# Patient Record
Sex: MALE | Race: BLACK OR AFRICAN AMERICAN
[De-identification: names, ages, dates, MRNs, and addresses within clinical notes are randomized per-mention and may not be internally consistent; named-entity substitution may affect disease eponyms.]

---

## 2017-12-19 ENCOUNTER — HOSPITAL ENCOUNTER (OUTPATIENT)
Dept: HOSPITAL 62 - ER | Age: 32
Setting detail: OBSERVATION
LOS: 2 days | Discharge: HOME | End: 2017-12-21
Attending: INTERNAL MEDICINE | Admitting: INTERNAL MEDICINE
Payer: MEDICARE

## 2017-12-19 DIAGNOSIS — Z83.2: ICD-10-CM

## 2017-12-19 DIAGNOSIS — G89.29: ICD-10-CM

## 2017-12-19 DIAGNOSIS — Z90.49: ICD-10-CM

## 2017-12-19 DIAGNOSIS — D57.219: Primary | ICD-10-CM

## 2017-12-19 PROCEDURE — 81001 URINALYSIS AUTO W/SCOPE: CPT

## 2017-12-19 PROCEDURE — 80048 BASIC METABOLIC PNL TOTAL CA: CPT

## 2017-12-19 PROCEDURE — 99285 EMERGENCY DEPT VISIT HI MDM: CPT

## 2017-12-19 PROCEDURE — G0378 HOSPITAL OBSERVATION PER HR: HCPCS

## 2017-12-19 PROCEDURE — 85025 COMPLETE CBC W/AUTO DIFF WBC: CPT

## 2017-12-19 PROCEDURE — 80053 COMPREHEN METABOLIC PANEL: CPT

## 2017-12-19 PROCEDURE — 96375 TX/PRO/DX INJ NEW DRUG ADDON: CPT

## 2017-12-19 PROCEDURE — 85045 AUTOMATED RETICULOCYTE COUNT: CPT

## 2017-12-19 PROCEDURE — 80307 DRUG TEST PRSMV CHEM ANLYZR: CPT

## 2017-12-19 PROCEDURE — 96374 THER/PROPH/DIAG INJ IV PUSH: CPT

## 2017-12-19 PROCEDURE — 96376 TX/PRO/DX INJ SAME DRUG ADON: CPT

## 2017-12-19 PROCEDURE — 36415 COLL VENOUS BLD VENIPUNCTURE: CPT

## 2017-12-20 LAB
ALBUMIN SERPL-MCNC: 3 G/DL (ref 3.5–5)
ALP SERPL-CCNC: 43 U/L (ref 38–126)
ALT SERPL-CCNC: 18 U/L (ref 21–72)
ANION GAP SERPL CALC-SCNC: 8 MMOL/L (ref 5–19)
ANION GAP SERPL CALC-SCNC: 9 MMOL/L (ref 5–19)
APPEARANCE UR: CLEAR
AST SERPL-CCNC: 12 U/L (ref 17–59)
BARBITURATES UR QL SCN: NEGATIVE
BASOPHILS # BLD AUTO: 0.2 10^3/UL (ref 0–0.2)
BASOPHILS # BLD AUTO: 0.2 10^3/UL (ref 0–0.2)
BASOPHILS NFR BLD AUTO: 1.7 % (ref 0–2)
BASOPHILS NFR BLD AUTO: 1.9 % (ref 0–2)
BILIRUB DIRECT SERPL-MCNC: 0.2 MG/DL (ref 0–0.4)
BILIRUB SERPL-MCNC: 2.3 MG/DL (ref 0.2–1.3)
BILIRUB UR QL STRIP: NEGATIVE
BUN SERPL-MCNC: 7 MG/DL (ref 7–20)
BUN SERPL-MCNC: 9 MG/DL (ref 7–20)
CALCIUM: 7.6 MG/DL (ref 8.4–10.2)
CALCIUM: 9.8 MG/DL (ref 8.4–10.2)
CHLORIDE SERPL-SCNC: 107 MMOL/L (ref 98–107)
CHLORIDE SERPL-SCNC: 113 MMOL/L (ref 98–107)
CO2 SERPL-SCNC: 23 MMOL/L (ref 22–30)
CO2 SERPL-SCNC: 27 MMOL/L (ref 22–30)
CREAT SERPL-MCNC: 0.77 MG/DL (ref 0.52–1.25)
CREAT SERPL-MCNC: 0.93 MG/DL (ref 0.52–1.25)
EOSINOPHIL # BLD AUTO: 0.5 10^3/UL (ref 0–0.6)
EOSINOPHIL # BLD AUTO: 0.6 10^3/UL (ref 0–0.6)
EOSINOPHIL NFR BLD AUTO: 5.7 % (ref 0–6)
EOSINOPHIL NFR BLD AUTO: 6.5 % (ref 0–6)
ERYTHROCYTE [DISTWIDTH] IN BLOOD BY AUTOMATED COUNT: 15.2 % (ref 11.5–14)
ERYTHROCYTE [DISTWIDTH] IN BLOOD BY AUTOMATED COUNT: 15.2 % (ref 11.5–14)
GLUCOSE SERPL-MCNC: 73 MG/DL (ref 75–110)
GLUCOSE SERPL-MCNC: 86 MG/DL (ref 75–110)
GLUCOSE UR STRIP-MCNC: NEGATIVE MG/DL
HCT VFR BLD CALC: 31.6 % (ref 37.9–51)
HCT VFR BLD CALC: 36.7 % (ref 37.9–51)
HGB BLD-MCNC: 11.2 G/DL (ref 13.5–17)
HGB BLD-MCNC: 12.8 G/DL (ref 13.5–17)
HGB HCT DIFFERENCE: 1.7
HGB HCT DIFFERENCE: 2
KETONES UR STRIP-MCNC: NEGATIVE MG/DL
LYMPHOCYTES # BLD AUTO: 3.6 10^3/UL (ref 0.5–4.7)
LYMPHOCYTES # BLD AUTO: 3.8 10^3/UL (ref 0.5–4.7)
LYMPHOCYTES NFR BLD AUTO: 38.9 % (ref 13–45)
LYMPHOCYTES NFR BLD AUTO: 42.9 % (ref 13–45)
MCH RBC QN AUTO: 30.2 PG (ref 27–33.4)
MCH RBC QN AUTO: 30.7 PG (ref 27–33.4)
MCHC RBC AUTO-ENTMCNC: 35 G/DL (ref 32–36)
MCHC RBC AUTO-ENTMCNC: 35.4 G/DL (ref 32–36)
MCV RBC AUTO: 86 FL (ref 80–97)
MCV RBC AUTO: 87 FL (ref 80–97)
METHADONE UR QL SCN: NEGATIVE
MONOCYTES # BLD AUTO: 0.9 10^3/UL (ref 0.1–1.4)
MONOCYTES # BLD AUTO: 1.1 10^3/UL (ref 0.1–1.4)
MONOCYTES NFR BLD AUTO: 10.7 % (ref 3–13)
MONOCYTES NFR BLD AUTO: 11.1 % (ref 3–13)
NEUTROPHILS # BLD AUTO: 3.3 10^3/UL (ref 1.7–8.2)
NEUTROPHILS # BLD AUTO: 4.1 10^3/UL (ref 1.7–8.2)
NEUTS SEG NFR BLD AUTO: 38.8 % (ref 42–78)
NEUTS SEG NFR BLD AUTO: 41.8 % (ref 42–78)
NITRITE UR QL STRIP: NEGATIVE
PCP UR QL SCN: NEGATIVE
PH UR STRIP: 6 [PH] (ref 5–9)
POTASSIUM SERPL-SCNC: 3.2 MMOL/L (ref 3.6–5)
POTASSIUM SERPL-SCNC: 4.2 MMOL/L (ref 3.6–5)
PROT SERPL-MCNC: 5.6 G/DL (ref 6.3–8.2)
PROT UR STRIP-MCNC: NEGATIVE MG/DL
RBC # BLD AUTO: 3.64 10^6/UL (ref 4.35–5.55)
RBC # BLD AUTO: 4.25 10^6/UL (ref 4.35–5.55)
SODIUM SERPL-SCNC: 143.3 MMOL/L (ref 137–145)
SODIUM SERPL-SCNC: 143.9 MMOL/L (ref 137–145)
SP GR UR STRIP: 1.01
URINE OPIATES LOW: NEGATIVE
UROBILINOGEN UR-MCNC: NEGATIVE MG/DL (ref ?–2)
WBC # BLD AUTO: 8.4 10^3/UL (ref 4–10.5)
WBC # BLD AUTO: 9.9 10^3/UL (ref 4–10.5)

## 2017-12-20 RX ADMIN — HEPARIN SODIUM SCH UNIT: 5000 INJECTION, SOLUTION INTRAVENOUS; SUBCUTANEOUS at 14:48

## 2017-12-20 RX ADMIN — DIPHENHYDRAMINE HYDROCHLORIDE PRN MG: 25 CAPSULE ORAL at 21:06

## 2017-12-20 RX ADMIN — PROMETHAZINE HYDROCHLORIDE PRN MG: 25 TABLET ORAL at 06:51

## 2017-12-20 RX ADMIN — SODIUM CHLORIDE SCH ML: 9 INJECTION, SOLUTION INTRAVENOUS at 14:48

## 2017-12-20 RX ADMIN — SODIUM CHLORIDE SCH ML: 9 INJECTION, SOLUTION INTRAVENOUS at 10:58

## 2017-12-20 RX ADMIN — HYDROMORPHONE HYDROCHLORIDE PRN MG: 2 INJECTION INTRAMUSCULAR; INTRAVENOUS; SUBCUTANEOUS at 14:48

## 2017-12-20 RX ADMIN — HYDROMORPHONE HYDROCHLORIDE PRN MG: 2 INJECTION INTRAMUSCULAR; INTRAVENOUS; SUBCUTANEOUS at 00:20

## 2017-12-20 RX ADMIN — HYDROMORPHONE HYDROCHLORIDE PRN MG: 2 INJECTION INTRAMUSCULAR; INTRAVENOUS; SUBCUTANEOUS at 01:19

## 2017-12-20 RX ADMIN — HEPARIN SODIUM SCH UNIT: 5000 INJECTION, SOLUTION INTRAVENOUS; SUBCUTANEOUS at 21:07

## 2017-12-20 RX ADMIN — PROMETHAZINE HYDROCHLORIDE PRN MG: 25 TABLET ORAL at 14:48

## 2017-12-20 RX ADMIN — SODIUM CHLORIDE SCH ML: 9 INJECTION, SOLUTION INTRAVENOUS at 06:51

## 2017-12-20 RX ADMIN — HYDROMORPHONE HYDROCHLORIDE PRN MG: 2 INJECTION INTRAMUSCULAR; INTRAVENOUS; SUBCUTANEOUS at 10:58

## 2017-12-20 RX ADMIN — HYDROMORPHONE HYDROCHLORIDE PRN MG: 2 INJECTION INTRAMUSCULAR; INTRAVENOUS; SUBCUTANEOUS at 02:29

## 2017-12-20 RX ADMIN — HYDROMORPHONE HYDROCHLORIDE PRN MG: 2 INJECTION INTRAMUSCULAR; INTRAVENOUS; SUBCUTANEOUS at 21:07

## 2017-12-20 RX ADMIN — HEPARIN SODIUM SCH UNIT: 5000 INJECTION, SOLUTION INTRAVENOUS; SUBCUTANEOUS at 06:51

## 2017-12-20 NOTE — PDOC PROGRESS REPORT
Subjective


Progress Note for:: 12/20/17


Subjective:: 


Pt is seen on morning rounds as a follow up for sickle cell pain.  He is found 

resting in bed comfortably on room air.


He reports that his pain is well controlled at the moment, but requests that he 

be ordered Benadryl and promethazine to take as adjuncts to the pain 

medication.  He reports that his outpatient pain medication regimen has been 

decreased recently and that he believes that this is what has exacerbated his 

sickle cell pain.  He states that he is currently prescribed oxycodone 15 mg 1-

2 tabs 3 times daily as needed for pain.  He reports that his last sickle cell 

crisis was in October and that otherwise he has been in his usual state of 

health.  


Currently, he complains of bilateral lower back pain and right hip pain that is 

worsened by ambulation.





Reason For Visit: 


SICKLE CELL PAIN CRISIS








Physical Exam


Vital Signs: 


 











Temp Pulse Resp BP Pulse Ox


 


 97.8 F   97   16   137/98 H  100 


 


 12/20/17 12:00  12/20/17 12:00  12/20/17 12:00  12/20/17 12:00  12/20/17 12:00








 Intake & Output











 12/19/17 12/20/17 12/21/17





 06:59 06:59 06:59


 


Weight  61.7 kg 











General appearance: PRESENT: no acute distress, well-developed, well-nourished


Head exam: PRESENT: atraumatic, normocephalic


Eye exam: PRESENT: conjunctiva pink, EOMI, PERRLA.  ABSENT: scleral icterus


Ear exam: PRESENT: normal external ear exam


Mouth exam: PRESENT: moist, tongue midline


Neck exam: ABSENT: carotid bruit, JVD, lymphadenopathy, thyromegaly


Respiratory exam: PRESENT: clear to auscultation sabrina.  ABSENT: rales, rhonchi, 

wheezes


Cardiovascular exam: PRESENT: RRR.  ABSENT: diastolic murmur, rubs, systolic 

murmur


Pulses: PRESENT: normal dorsalis pedis pul


Vascular exam: PRESENT: normal capillary refill


GI/Abdominal exam: PRESENT: normal bowel sounds, soft.  ABSENT: distended, 

guarding, mass, organolmegaly, rebound, tenderness


Rectal exam: PRESENT: deferred


Extremities exam: PRESENT: full ROM.  ABSENT: calf tenderness, clubbing, pedal 

edema


Neurological exam: PRESENT: alert, awake, oriented to person, oriented to place

, oriented to time, oriented to situation, CN II-XII grossly intact.  ABSENT: 

motor sensory deficit


Psychiatric exam: PRESENT: appropriate affect, normal mood.  ABSENT: homicidal 

ideation, suicidal ideation


Skin exam: PRESENT: dry, intact, warm.  ABSENT: cyanosis, rash





Results


Laboratory Results: 


 





 12/20/17 05:55 





 12/20/17 05:55 





 











  12/20/17 12/20/17





  05:55 05:55


 


WBC  8.4 


 


RBC  3.64 L 


 


Hgb  11.2 L 


 


Hct  31.6 L 


 


MCV  87 


 


MCH  30.7 


 


MCHC  35.4 


 


RDW  15.2 H 


 


Plt Count  359 


 


Seg Neutrophils %  38.8 L 


 


Lymphocytes %  42.9 


 


Monocytes %  10.7 


 


Eosinophils %  5.7 


 


Basophils %  1.9 


 


Absolute Neutrophils  3.3 


 


Absolute Lymphocytes  3.6 


 


Absolute Monocytes  0.9 


 


Absolute Eosinophils  0.5 


 


Absolute Basophils  0.2 


 


Sodium   143.9


 


Potassium   3.2 L D


 


Chloride   113 H


 


Carbon Dioxide   23


 


Anion Gap   8


 


BUN   7


 


Creatinine   0.77


 


Est GFR ( Amer)   > 60


 


Est GFR (Non-Af Amer)   > 60


 


Glucose   73 L


 


Calcium   7.6 L


 


Total Bilirubin   2.3 H


 


AST   12 L


 


ALT   18 L


 


Alkaline Phosphatase   43


 


Total Protein   5.6 L


 


Albumin   3.0 L














Assessment & Plan





- Diagnosis


(1) Sickle cell pain crisis


Is this a current diagnosis for this admission?: Yes   


Plan: 


No biochemical evidence for hemolysis, ischemia or increased reticulocytosis.


He has been admitted with IV fluid support and symptomatic management with 

narcotic pain medications and antiemetics.





Will resume the patient home medication; oxycodone 15 mg TID.


Will decrease dose/frequency of Dilaudid to q6 hrs prn severe/breakthrough 

pain.  Pt additionally has tylenol and toradol available as needed for pain.


Benadryl is provided q6 prn itching.  Promethazine has been discontinued as the 

pt denies ongoing nausea/vomiting.  As needed Zofran remains.








(2) Chronic pain


Plan: 


Review of the [place] Controlled Substance database reveals that he has recently 

been prescribed Morphine 15 mg 1 tab daily #30 and Oxycodone 15 mg 1 tab TID.





Will resume his home medication:Oxycodone 15 mg TID.  Remaining pain management 

plan as above.








- Time


Time Spent with patient: 15-24 minutes


Anticipated discharge: Home


Within: within 24 hours

## 2017-12-20 NOTE — ER DOCUMENT REPORT
ED General





- General


Chief Complaint: Sickle Cell Crisis


Stated Complaint: PAIN


Time Seen by Provider: 12/19/17 23:48


Notes: 





Patient is a 32-year-old male with a past medical history of sickle cell anemia 

hemoglobin SC who presents with an acute pain crisis.  He describes pain as 

being in his bilateral hips and thighs bilaterally as well as into his low 

back.  He reports that this is very typical for sickle cell pain crisis.  He 

states the pancreas started earlier today and has been a constant, throbbing, 

moderate to severe pain.  He took oxycodone 15 mg tablet at home without any 

relief of his pain.  He denies any associated fever, nausea, vomiting, 

shortness of breath or chest pain.  He denies any history of acute chest 

syndrome in the past.  He has not seen his hematologist regarding today's 

concerns although he states he did inform him that he was coming to the 

hospital today.


TRAVEL OUTSIDE OF THE [place] IN LAST 30 DAYS: No





- Related Data


Allergies/Adverse Reactions: 


 





No Known Allergies Allergy (Unverified 12/20/17 00:17)


 











Past Medical History





- General


Information source: Patient





- Social History


Smoking Status: Never Smoker


Chew tobacco use (# tins/day): No


Frequency of alcohol use: None


Drug Abuse: None


Lives with: Friend


Family History: Reviewed & Not Pertinent


Patient has suicidal ideation: No


Patient has homicidal ideation: No


Renal/ Medical History: Denies: Hx Peritoneal Dialysis


Past Surgical History: Reports: Hx Cholecystectomy





Review of Systems





- Review of Systems


Notes: 





Constitutional: Negative for fever.


HENT: Negative for sore throat.


Eyes: Negative for visual changes.


Cardiovascular: Negative for chest pain.


Respiratory: Negative for shortness of breath.


Gastrointestinal: Negative for abdominal pain, vomiting or diarrhea.


Genitourinary: Negative for dysuria.


Musculoskeletal: Positive for back and hip pain


Skin: Negative for rash.


Neurological: Negative for headaches, weakness or numbness.





10 point ROS negative except as marked above and in HPI.





Physical Exam





- Vital signs


Vitals: 


 











Temp Pulse Resp BP Pulse Ox


 


 98.5 F   98   18   145/93 H  100 


 


 12/19/17 22:26  12/19/17 22:26  12/19/17 22:26  12/19/17 22:26  12/19/17 22:26











Interpretation: Normal


Notes: 





PHYSICAL EXAMINATION:





GENERAL: Well-appearing, well-nourished and in no acute distress.





HEAD: Atraumatic, normocephalic.





EYES: Pupils equal round and reactive to light, extraocular movements intact, 

sclera anicteric, conjunctiva are normal.





ENT: nares patent, oropharynx clear without exudates.  Moist mucous membranes.





NECK: Normal range of motion, supple without lymphadenopathy





LUNGS: Breath sounds clear to auscultation bilaterally and equal.  No wheezes 

rales or rhonchi.





HEART: Regular rate and rhythm without murmurs





ABDOMEN: Soft, nontender, normoactive bowel sounds.  No guarding, no rebound.  

No masses appreciated.





EXTREMITIES: Normal range of motion, no pitting or edema.  No cyanosis.





NEUROLOGICAL: No focal neurological deficits. Moves all extremities 

spontaneously and on command.





PSYCH: Normal mood, normal affect.





SKIN: Warm, Dry, normal turgor, no rashes or lesions noted.





Course





- Re-evaluation


Re-evalutation: 





12/20/17 00:17


Presentation is most consistent with an uncomplicated sickle cell pain crisis.  

Patient has no evidence of an aplastic crisis on labs.  Clinical history and 

vitals are not consistent with acute chest syndrome and patient has no history 

of this diagnosis.  Vitals have remained within normal limits here in the 

emergency department.  I have been unable to adequately control the patient's 

pain with IV analgesia and will hospitalize him at his request.  








- Vital Signs


Vital signs: 


 











Temp Pulse Resp BP Pulse Ox


 


 98.5 F   91   16   144/91 H  99 


 


 12/20/17 02:30  12/20/17 02:30  12/20/17 02:30  12/20/17 02:30  12/20/17 02:30














- Laboratory


Result Diagrams: 


 12/20/17 00:02





 12/20/17 00:02


Laboratory results interpreted by me: 


 











  12/20/17





  00:02


 


RBC  4.25 L


 


Hgb  12.8 L


 


Hct  36.7 L


 


RDW  15.2 H


 


Seg Neutrophils %  41.8 L


 


Eosinophils %  6.5 H














Discharge





- Discharge


Clinical Impression: 


 Sickle cell pain crisis





Condition: Fair


Disposition: ADMITTED AS OBSERVATION


Admitting Provider: Hospitalist - Alex


Unit Admitted: Medical Floor


Additional Instructions: 


You were seen today for sickle cell pain crisis.  Please follow-up with your 

hematologist.  Returning to the ED if you have worsening pain, fever greater 

than 100.4, shortness of breath, persistent vomiting, or any other symptoms 

that are concerning to you.

## 2017-12-20 NOTE — PDOC H&P
History of Present Illness


Admission Date/PCP: 


  12/20/17 04:19





  GABI PARKS MD





Patient complains of: Sickle cell pain crisis


History of Present Illness: 


BENIGNO KATZ is a 32 year old male with a past medical history of sickle 

cell trait.  Presents with 24 hours of hip and leg pain resembling previous 

sickle cell pain crisis.  He denies fever chills nausea vomiting, he admits 

cold exposure triggering his crisis.  Denies recent change in medicine and is 

otherwise felt well.  In the emergency room he has an unremarkable workup and 

receives empiric management with IV fluid challenge and Dilaudid.  He is 

referred to the hospitalist for admission








Past Medical History


Medical History: None


Hematology: Reports: Other - Sickle cell trait





Past Surgical History


Past Surgical History: Reports: Cholecystectomy, Other - Port placement





Social History


Information Source: Patient


Lives with: Friend


Smoking Status: Never Smoker


Drugs: None





- Advance Directive


Resuscitation Status: Full Code





Family History


Family History: CAD, Other - Sickle cell


Parental Family History Reviewed: Yes


Children Family History Reviewed: Yes


Sibling(s) Family History Reviewed.: Yes





Medication/Allergy


Allergies/Adverse Reactions: 


 





No Known Allergies Allergy (Unverified 12/20/17 00:17)


 











Review of Systems


Constitutional: ABSENT: chills, fever(s), headache(s), weight gain, weight loss


Eyes: ABSENT: visual disturbances


Ears: ABSENT: hearing changes


Cardiovascular: ABSENT: chest pain, dyspnea on exertion, edema, orthropnea, 

palpitations


Respiratory: ABSENT: cough, hemoptysis


Gastrointestinal: ABSENT: abdominal pain, constipation, diarrhea, hematemesis, 

hematochezia, nausea, vomiting


Genitourinary: ABSENT: dysuria, hematuria


Musculoskeletal: ABSENT: joint swelling


Integumentary: ABSENT: rash, wounds


Neurological: ABSENT: abnormal gait, abnormal speech, confusion, dizziness, 

focal weakness, syncope


Psychiatric: ABSENT: anxiety, depression, homidical ideation, suicidal ideation


Endocrine: ABSENT: cold intolerance, heat intolerance, polydipsia, polyuria


Hematologic/Lymphatic: ABSENT: easy bleeding, easy bruising





Physical Exam


Vital Signs: 


 











Temp Pulse Resp BP Pulse Ox


 


 98.4 F   85   16   156/100 H  100 


 


 12/20/17 06:25  12/20/17 06:25  12/20/17 06:25  12/20/17 06:25  12/20/17 06:25








 Intake & Output











 12/18/17 12/19/17 12/20/17





 11:59 11:59 11:59


 


Weight   61.7 kg











General appearance: PRESENT: no acute distress, well-developed, well-nourished


Head exam: PRESENT: atraumatic, normocephalic


Eye exam: PRESENT: conjunctiva pink, EOMI, PERRLA.  ABSENT: scleral icterus


Ear exam: PRESENT: normal external ear exam


Mouth exam: PRESENT: moist, tongue midline


Neck exam: ABSENT: carotid bruit, JVD, lymphadenopathy, thyromegaly


Respiratory exam: PRESENT: clear to auscultation sabrina.  ABSENT: rales, rhonchi, 

wheezes


Cardiovascular exam: PRESENT: RRR.  ABSENT: diastolic murmur, rubs, systolic 

murmur


Pulses: PRESENT: normal dorsalis pedis pul


Vascular exam: PRESENT: normal capillary refill


GI/Abdominal exam: PRESENT: normal bowel sounds, soft.  ABSENT: distended, 

guarding, mass, organolmegaly, rebound, tenderness


Rectal exam: PRESENT: deferred


Extremities exam: PRESENT: full ROM.  ABSENT: calf tenderness, clubbing, pedal 

edema


Neurological exam: PRESENT: alert, awake, oriented to person, oriented to place

, oriented to time, oriented to situation, CN II-XII grossly intact.  ABSENT: 

motor sensory deficit


Psychiatric exam: PRESENT: appropriate affect, normal mood.  ABSENT: homicidal 

ideation, suicidal ideation


Skin exam: PRESENT: dry, intact, warm.  ABSENT: cyanosis, rash





Results


Laboratory Results: 


 





 12/20/17 05:55 





 12/20/17 05:55 





 











  12/20/17 12/20/17





  05:55 05:55


 


WBC  8.4 


 


RBC  3.64 L 


 


Hgb  11.2 L 


 


Hct  31.6 L 


 


MCV  87 


 


MCH  30.7 


 


MCHC  35.4 


 


RDW  15.2 H 


 


Plt Count  359 


 


Seg Neutrophils %  38.8 L 


 


Lymphocytes %  42.9 


 


Monocytes %  10.7 


 


Eosinophils %  5.7 


 


Basophils %  1.9 


 


Absolute Neutrophils  3.3 


 


Absolute Lymphocytes  3.6 


 


Absolute Monocytes  0.9 


 


Absolute Eosinophils  0.5 


 


Absolute Basophils  0.2 


 


Sodium   143.9


 


Potassium   3.2 L D


 


Chloride   113 H


 


Carbon Dioxide   23


 


Anion Gap   8


 


BUN   7


 


Creatinine   0.77


 


Est GFR ( Amer)   > 60


 


Est GFR (Non-Af Amer)   > 60


 


Glucose   73 L


 


Calcium   7.6 L


 


Total Bilirubin   2.3 H


 


AST   12 L


 


ALT   18 L


 


Alkaline Phosphatase   43


 


Total Protein   5.6 L


 


Albumin   3.0 L














Assessment & Plan





- Diagnosis


(1) Sickle cell pain crisis


Is this a current diagnosis for this admission?: Yes   


Plan: 


No biochemical evidence for hemolysis, ischemia or increased reticulocytosis.  

Observation on the medical floor, education, symptomatic management, IV fluid 

challenge.  Follow-up CBC.








- Time


Time Spent: 30 to 50 Minutes

## 2017-12-21 VITALS — DIASTOLIC BLOOD PRESSURE: 91 MMHG | SYSTOLIC BLOOD PRESSURE: 131 MMHG

## 2017-12-21 LAB
ALBUMIN SERPL-MCNC: 3.8 G/DL (ref 3.5–5)
ALP SERPL-CCNC: 56 U/L (ref 38–126)
ALT SERPL-CCNC: 21 U/L (ref 21–72)
ANION GAP SERPL CALC-SCNC: 9 MMOL/L (ref 5–19)
ANISOCYTOSIS BLD QL SMEAR: (no result)
AST SERPL-CCNC: 16 U/L (ref 17–59)
BASOPHILS # BLD AUTO: 0.2 10^3/UL (ref 0–0.2)
BASOPHILS NFR BLD AUTO: 1.6 % (ref 0–2)
BILIRUB DIRECT SERPL-MCNC: 0.3 MG/DL (ref 0–0.4)
BILIRUB SERPL-MCNC: 3 MG/DL (ref 0.2–1.3)
BUN SERPL-MCNC: 10 MG/DL (ref 7–20)
CALCIUM: 9.1 MG/DL (ref 8.4–10.2)
CHLORIDE SERPL-SCNC: 100 MMOL/L (ref 98–107)
CO2 SERPL-SCNC: 30 MMOL/L (ref 22–30)
CREAT SERPL-MCNC: 0.96 MG/DL (ref 0.52–1.25)
DACRYOCYTES BLD QL SMEAR: SLIGHT
EOSINOPHIL # BLD AUTO: 1.1 10^3/UL (ref 0–0.6)
EOSINOPHIL NFR BLD AUTO: 10.2 % (ref 0–6)
ERYTHROCYTE [DISTWIDTH] IN BLOOD BY AUTOMATED COUNT: 15 % (ref 11.5–14)
GLUCOSE SERPL-MCNC: 98 MG/DL (ref 75–110)
HCT VFR BLD CALC: 36.7 % (ref 37.9–51)
HGB BLD-MCNC: 12.9 G/DL (ref 13.5–17)
HGB HCT DIFFERENCE: 2
LYMPHOCYTES # BLD AUTO: 4.7 10^3/UL (ref 0.5–4.7)
LYMPHOCYTES NFR BLD AUTO: 43.1 % (ref 13–45)
MCH RBC QN AUTO: 30.1 PG (ref 27–33.4)
MCHC RBC AUTO-ENTMCNC: 35.2 G/DL (ref 32–36)
MCV RBC AUTO: 86 FL (ref 80–97)
MONOCYTES # BLD AUTO: 0.9 10^3/UL (ref 0.1–1.4)
MONOCYTES NFR BLD AUTO: 8.7 % (ref 3–13)
NEUTROPHILS # BLD AUTO: 3.9 10^3/UL (ref 1.7–8.2)
NEUTS SEG NFR BLD AUTO: 36.4 % (ref 42–78)
POIKILOCYTOSIS BLD QL SMEAR: (no result)
POLYCHROMASIA BLD QL SMEAR: SLIGHT
POTASSIUM SERPL-SCNC: 4.1 MMOL/L (ref 3.6–5)
PROT SERPL-MCNC: 6.7 G/DL (ref 6.3–8.2)
RBC # BLD AUTO: 4.29 10^6/UL (ref 4.35–5.55)
SCHISTOCYTES BLD QL SMEAR: SLIGHT
SODIUM SERPL-SCNC: 139 MMOL/L (ref 137–145)
TARGETS BLD QL SMEAR: (no result)
WBC # BLD AUTO: 10.8 10^3/UL (ref 4–10.5)

## 2017-12-21 RX ADMIN — DIPHENHYDRAMINE HYDROCHLORIDE PRN MG: 25 CAPSULE ORAL at 02:59

## 2017-12-21 RX ADMIN — HEPARIN SODIUM SCH UNIT: 5000 INJECTION, SOLUTION INTRAVENOUS; SUBCUTANEOUS at 05:54

## 2017-12-21 RX ADMIN — HYDROMORPHONE HYDROCHLORIDE PRN MG: 2 INJECTION INTRAMUSCULAR; INTRAVENOUS; SUBCUTANEOUS at 02:59

## 2017-12-21 NOTE — PDOC DISCHARGE SUMMARY
General





- Admit/Disc Date/PCP


Admission Date/Primary Care Provider: 


  12/20/17 04:19





  





Discharge Date: 12/21/17





- Discharge Diagnosis


(1) Sickle cell pain crisis


Is this a current diagnosis for this admission?: Yes   





(2) Chronic pain


Is this a current diagnosis for this admission?: Yes   





- Additional Information


Resuscitation Status: Full Code


Discharge Diet: As Tolerated, Other (Comments)


Discharge Activity: Activity As Tolerated, Balance Activity w/Rest, Slowly 

Increase Activity


Prescriptions: 


Oxycodone HCl [Oxy-Ir 5 mg Tablet] 5 mg PO Q4HP PRN #20 tablet


 PRN Reason: 


Home Medications: 








Folic Acid [Folvite 1 mg Tablet] 1 mg PO DAILY 12/20/17 


Hydroxyurea [Hydrea 500 mg Capsule] 1,000 mg PO DAILY 12/20/17 


Oxycodone HCl 15 mg PO Q8HP PRN 12/20/17 


Promethazine HCl [Phenergan 25 mg Tablet] 25 mg PO Q6HP PRN 12/20/17 


Acetaminophen [Tylenol 325 mg Tablet] 650 mg PO Q4HP PRN  tablet 12/21/17 


Diphenhydramine HCl [Benadryl 25 mg Capsule] 25 mg PO Q6HP PRN  capsule 12/21/ 17 


Oxycodone HCl [Oxy-Ir 5 mg Tablet] 5 mg PO Q4HP PRN #20 tablet 12/21/17 











History of Present Illness


History of Present Illness: 


Per H&P by Dr. Johnson:  BENIGNO KATZ is a 32 year old male with a past 

medical history of sickle cell trait.  He presents with 24 hours of hip and leg 

pain resembling previous sickle cell pain crisis.  He denies fever chills 

nausea vomiting, he admits cold exposure triggering his crisis.  Denies recent 

change in medicine and otherwise felt well.  In the emergency room he has an 

unremarkable workup and received empiric management with IV fluid challenge and 

Dilaudid.  He is referred to the hospitalist for admission








Hospital Course


Hospital Course: 


The patient was admitted for complaint of pain to hip and leg resembling 

previous sickle cell pain crisis.  Evaluation did not reveal biochemocal 

evidence for hemolysis, ischemia, anemia, or increased reticulocytosis.  The 

patient was symptomatically managed with IV fluids, IV Dilaudid, Toradol, 

Benadryl and Phenergan.  The patient stated that his pain was well-controlled 

with this regimen.  


He did admit to having run out of his chronic pain medications a few days prior 

to admission.  Review of the [place] Controlled Substance Database confirmed that 

the patient received Oxycodone 15 mg tabs #150 on 11/29/17 and Morphine 15 mg 

tabs #30 on 11/25/17.  The patient was informed that he was stable for 

discharge and that we could not replace his missing chronic pain medications.  

He was instructed to follow up with his pain management or primary care 

provider.  The patient expressed understanding and consented to be discharged.








Physical Exam


Vital Signs: 


 











Temp Pulse Resp BP Pulse Ox


 


 98.4 F   89   16   131/91 H  99 


 


 12/21/17 10:35  12/21/17 10:35  12/21/17 10:35  12/21/17 10:35  12/21/17 10:35








 Intake & Output











 12/20/17 12/21/17 12/22/17





 06:59 06:59 06:59


 


Intake Total  1530 


 


Output Total  600 


 


Balance  930 


 


Weight 61.7 kg 65.8 kg 











General appearance: PRESENT: no acute distress, well-developed, well-nourished


Head exam: PRESENT: atraumatic, normocephalic


Eye exam: PRESENT: conjunctiva pink, EOMI, PERRLA.  ABSENT: scleral icterus


Ear exam: PRESENT: normal external ear exam


Mouth exam: PRESENT: moist, tongue midline


Neck exam: ABSENT: carotid bruit, JVD, lymphadenopathy, thyromegaly


Respiratory exam: PRESENT: clear to auscultation sabrina.  ABSENT: rales, rhonchi, 

wheezes


Cardiovascular exam: PRESENT: RRR.  ABSENT: diastolic murmur, rubs, systolic 

murmur


Pulses: PRESENT: normal dorsalis pedis pul


Vascular exam: PRESENT: normal capillary refill


GI/Abdominal exam: PRESENT: normal bowel sounds, soft.  ABSENT: distended, 

guarding, mass, organolmegaly, rebound, tenderness


Rectal exam: PRESENT: deferred


Extremities exam: PRESENT: full ROM.  ABSENT: calf tenderness, clubbing, pedal 

edema


Neurological exam: PRESENT: alert, awake, oriented to person, oriented to place

, oriented to time, oriented to situation, CN II-XII grossly intact.  ABSENT: 

motor sensory deficit


Psychiatric exam: PRESENT: appropriate affect, normal mood.  ABSENT: homicidal 

ideation, suicidal ideation


Skin exam: PRESENT: dry, intact, warm.  ABSENT: cyanosis, rash





Results


Laboratory Results: 


 





 12/21/17 05:58 





 12/21/17 05:58 





 











  12/21/17 12/21/17





  05:58 05:58


 


WBC  10.8 H 


 


RBC  4.29 L 


 


Hgb  12.9 L 


 


Hct  36.7 L 


 


MCV  86 


 


MCH  30.1 


 


MCHC  35.2 


 


RDW  15.0 H 


 


Plt Count  420 


 


Seg Neutrophils %  36.4 L 


 


Lymphocytes %  43.1 


 


Monocytes %  8.7 


 


Eosinophils %  10.2 H 


 


Basophils %  1.6 


 


Absolute Neutrophils  3.9 


 


Absolute Lymphocytes  4.7 


 


Absolute Monocytes  0.9 


 


Absolute Eosinophils  1.1 H 


 


Absolute Basophils  0.2 


 


Sodium   139.0


 


Potassium   4.1


 


Chloride   100


 


Carbon Dioxide   30


 


Anion Gap   9


 


BUN   10


 


Creatinine   0.96


 


Est GFR ( Amer)   > 60


 


Est GFR (Non-Af Amer)   > 60


 


Glucose   98


 


Calcium   9.1


 


Total Bilirubin   3.0 H


 


AST   16 L


 


ALT   21


 


Alkaline Phosphatase   56


 


Total Protein   6.7


 


Albumin   3.8














Qualifiers


**PATEINT BEING DISCHARGED WITH ANY OF THE FOLLOWING DIAGNOSIS?: No

## 2018-04-06 ENCOUNTER — HOSPITAL ENCOUNTER (EMERGENCY)
Dept: HOSPITAL 62 - ER | Age: 33
Discharge: HOME | End: 2018-04-06
Payer: MEDICARE

## 2018-04-06 VITALS — SYSTOLIC BLOOD PRESSURE: 141 MMHG | DIASTOLIC BLOOD PRESSURE: 87 MMHG

## 2018-04-06 DIAGNOSIS — M25.552: ICD-10-CM

## 2018-04-06 DIAGNOSIS — G89.29: ICD-10-CM

## 2018-04-06 DIAGNOSIS — M25.551: ICD-10-CM

## 2018-04-06 DIAGNOSIS — D57.00: Primary | ICD-10-CM

## 2018-04-06 DIAGNOSIS — Z90.49: ICD-10-CM

## 2018-04-06 LAB
ABSOLUTE RETICS #: 0.22 10^6/UL (ref 0.03–0.12)
ADD MANUAL DIFF: NO
ALBUMIN SERPL-MCNC: 4.3 G/DL (ref 3.5–5)
ALP SERPL-CCNC: 72 U/L (ref 38–126)
ALT SERPL-CCNC: 19 U/L (ref 21–72)
ANION GAP SERPL CALC-SCNC: 10 MMOL/L (ref 5–19)
AST SERPL-CCNC: 15 U/L (ref 17–59)
BASOPHILS # BLD AUTO: 0.1 10^3/UL (ref 0–0.2)
BASOPHILS NFR BLD AUTO: 0.6 % (ref 0–2)
BILIRUB DIRECT SERPL-MCNC: 0.2 MG/DL (ref 0–0.4)
BILIRUB SERPL-MCNC: 2 MG/DL (ref 0.2–1.3)
BUN SERPL-MCNC: 7 MG/DL (ref 7–20)
CALCIUM: 10.3 MG/DL (ref 8.4–10.2)
CHLORIDE SERPL-SCNC: 106 MMOL/L (ref 98–107)
CO2 SERPL-SCNC: 28 MMOL/L (ref 22–30)
EOSINOPHIL # BLD AUTO: 0.1 10^3/UL (ref 0–0.6)
EOSINOPHIL NFR BLD AUTO: 0.7 % (ref 0–6)
ERYTHROCYTE [DISTWIDTH] IN BLOOD BY AUTOMATED COUNT: 18.7 % (ref 11.5–14)
GLUCOSE SERPL-MCNC: 94 MG/DL (ref 75–110)
HCT VFR BLD CALC: 37.8 % (ref 37.9–51)
HGB BLD-MCNC: 12.9 G/DL (ref 13.5–17)
LYMPHOCYTES # BLD AUTO: 2.3 10^3/UL (ref 0.5–4.7)
LYMPHOCYTES NFR BLD AUTO: 20.4 % (ref 13–45)
MCH RBC QN AUTO: 29 PG (ref 27–33.4)
MCHC RBC AUTO-ENTMCNC: 34.2 G/DL (ref 32–36)
MCV RBC AUTO: 85 FL (ref 80–97)
MONOCYTES # BLD AUTO: 0.9 10^3/UL (ref 0.1–1.4)
MONOCYTES NFR BLD AUTO: 7.8 % (ref 3–13)
NEUTROPHILS # BLD AUTO: 7.8 10^3/UL (ref 1.7–8.2)
NEUTS SEG NFR BLD AUTO: 70.5 % (ref 42–78)
PLATELET # BLD: 578 10^3/UL (ref 150–450)
POTASSIUM SERPL-SCNC: 4.3 MMOL/L (ref 3.6–5)
PROT SERPL-MCNC: 7.4 G/DL (ref 6.3–8.2)
RBC # BLD AUTO: 4.46 10^6/UL (ref 4.35–5.55)
RETICULOCYTE COUNT (AUTO): 4.85 % (ref 0.66–2.85)
SODIUM SERPL-SCNC: 143.7 MMOL/L (ref 137–145)
TOTAL CELLS COUNTED % (AUTO): 100 %
WBC # BLD AUTO: 11.1 10^3/UL (ref 4–10.5)

## 2018-04-06 PROCEDURE — 85045 AUTOMATED RETICULOCYTE COUNT: CPT

## 2018-04-06 PROCEDURE — 96374 THER/PROPH/DIAG INJ IV PUSH: CPT

## 2018-04-06 PROCEDURE — 96375 TX/PRO/DX INJ NEW DRUG ADDON: CPT

## 2018-04-06 PROCEDURE — 96361 HYDRATE IV INFUSION ADD-ON: CPT

## 2018-04-06 PROCEDURE — 36591 DRAW BLOOD OFF VENOUS DEVICE: CPT

## 2018-04-06 PROCEDURE — 80053 COMPREHEN METABOLIC PANEL: CPT

## 2018-04-06 PROCEDURE — 96376 TX/PRO/DX INJ SAME DRUG ADON: CPT

## 2018-04-06 PROCEDURE — 99284 EMERGENCY DEPT VISIT MOD MDM: CPT

## 2018-04-06 PROCEDURE — 36415 COLL VENOUS BLD VENIPUNCTURE: CPT

## 2018-04-06 PROCEDURE — 85025 COMPLETE CBC W/AUTO DIFF WBC: CPT

## 2018-04-06 RX ADMIN — MORPHINE SULFATE PRN MG: 10 INJECTION INTRAMUSCULAR; INTRAVENOUS; SUBCUTANEOUS at 18:43

## 2018-04-06 RX ADMIN — MORPHINE SULFATE PRN MG: 10 INJECTION INTRAMUSCULAR; INTRAVENOUS; SUBCUTANEOUS at 21:16

## 2018-04-06 RX ADMIN — MORPHINE SULFATE PRN MG: 10 INJECTION INTRAMUSCULAR; INTRAVENOUS; SUBCUTANEOUS at 20:06

## 2018-04-06 NOTE — ER DOCUMENT REPORT
ED General





- General


Chief Complaint: Sickle Cell Crisis


Stated Complaint: LEG PAIN


Time Seen by Provider: 04/06/18 17:36


Notes: 





Patient is a 32-year-old male with a past medical history of sickle cell anemia 

with associated chronic pain who presents with acute exacerbation of his 

baseline chronic sickle cell pain.  Describes his pain to the bilateral hips 

and legs which is typical for his sickle cell crisis.  Symptoms started 24 

hours ago and have been worsening since that time.  He tried home oxycodone 

without any improvement.  Nothing worsens his symptoms.  He states this feels 

identical to his prior sickle cell crises.  He denies any associated fever, 

vomiting, cough or shortness of breath.  He has not spoken to his hematologist 

regarding today's concerns.


TRAVEL OUTSIDE OF THE [place] IN LAST 30 DAYS: No





- Related Data


Allergies/Adverse Reactions: 


 





No Known Allergies Allergy (Verified 04/06/18 16:56)


 











Past Medical History





- General


Information source: Patient





- Social History


Smoking Status: Never Smoker


Chew tobacco use (# tins/day): No


Frequency of alcohol use: Social


Drug Abuse: None


Lives with: Friend


Family History: CAD, Other - Sickle cell


Patient has suicidal ideation: No


Patient has homicidal ideation: No


Renal/ Medical History: Denies: Hx Peritoneal Dialysis


Past Surgical History: Reports: Hx Cholecystectomy, Other - Port placement





Review of Systems





- Review of Systems


Notes: 





Constitutional: Negative for fever.


HENT: Negative for sore throat.


Eyes: Negative for visual changes.


Cardiovascular: Negative for chest pain.


Respiratory: Negative for shortness of breath.


Gastrointestinal: Negative for abdominal pain, vomiting or diarrhea.


Genitourinary: Negative for dysuria.


Musculoskeletal: Positive for bilateral hip pain and leg pain


Skin: Negative for rash.


Neurological: Negative for headaches, weakness or numbness.





10 point ROS negative except as marked above and in HPI.





Physical Exam





- Vital signs


Vitals: 


 











Temp Pulse Resp BP Pulse Ox


 


 98.2 F   88   16   145/91 H  99 


 


 04/06/18 16:58  04/06/18 16:58  04/06/18 16:58  04/06/18 16:58  04/06/18 16:58











Interpretation: Normal


Notes: 





PHYSICAL EXAMINATION:





GENERAL: Well-appearing, well-nourished and in no acute distress.





HEAD: Atraumatic, normocephalic.





EYES: Pupils equal round and reactive to light, extraocular movements intact, 

sclera anicteric, conjunctiva are normal.





ENT: nares patent, oropharynx clear without exudates.  Moist mucous membranes.





NECK: Normal range of motion, supple without lymphadenopathy





LUNGS: Breath sounds clear to auscultation bilaterally and equal.  No wheezes 

rales or rhonchi.





HEART: Regular rate and rhythm without murmurs





ABDOMEN: Soft, nontender, normoactive bowel sounds.  No guarding, no rebound.  

No masses appreciated.





EXTREMITIES: Normal range of motion, no pitting or edema.  No cyanosis.





NEUROLOGICAL: No focal neurological deficits. Moves all extremities 

spontaneously and on command.





PSYCH: Normal mood, normal affect.





SKIN: Warm, Dry, normal turgor, no rashes or lesions noted.





Course





- Re-evaluation


Re-evalutation: 





04/06/18 19:08


Presentation is most consistent with an uncomplicated sickle cell pain crisis.  

Patient has no evidence of an aplastic crisis on labs.  History and vitals are 

not consistent with acute chest syndrome.  Vitals have remained within normal 

limits here in the emergency department.  Patient's pain has been able to be 

controlled using IV analgesia.  The patient is agreeable to discharge home at 

this time.  I recommended that they follow closely with their primary 

hematologist.  At this time will discharge with return precautions and follow-

up recommendations.  Verbal discharge instructions given a the bedside and 

opportunity for questions given. Medication warnings reviewed. Patient is in 

agreement with this plan and has verbalized understanding of return precautions 

and the need for primary care follow-up in the next 24-72 hours.





- Vital Signs


Vital signs: 


 











Temp Pulse Resp BP Pulse Ox


 


 98.2 F   88   14   141/87 H  100 


 


 04/06/18 16:58  04/06/18 16:58  04/06/18 22:01  04/06/18 22:01  04/06/18 22:01














- Laboratory


Result Diagrams: 


 04/06/18 18:10





 04/06/18 18:10


Laboratory results interpreted by me: 


 











  04/06/18 04/06/18





  18:10 18:10


 


WBC  11.1 H 


 


Hgb  12.9 L 


 


Hct  37.8 L 


 


RDW  18.7 H 


 


Plt Count  578 H 


 


Retic Count (auto)  4.85 H 


 


Absolute Retic  0.217 H 


 


Calcium   10.3 H


 


Total Bilirubin   2.0 H


 


AST   15 L


 


ALT   19 L














Discharge





- Discharge


Clinical Impression: 


 Sickle cell pain crisis





Chronic pain


Qualifiers:


 Chronic pain type: other chronic pain Qualified Code(s): G89.29 - Other 

chronic pain





Condition: Good


Disposition: HOME, SELF-CARE


Additional Instructions: 


You were seen today for sickle cell pain crisis.  Please follow-up with your 

hematologist.  Returning to the ED if you have worsening pain, fever greater 

than 100.4, shortness of breath, persistent vomiting, or any other symptoms 

that are concerning to you.


Referrals: 


GABI PARKS MD [Primary Care Provider] - Follow up as needed

## 2018-04-06 NOTE — ER DOCUMENT REPORT
ED Medical Screen (RME)





- General


Chief Complaint: Sickle Cell Crisis


Stated Complaint: LEG PAIN


Time Seen by Provider: 04/06/18 17:36


Notes: 





RME DISCLOSURE


I have seen this patient as part of a Rapid Medical Evaluation and, if 

applicable, placed any initially appropriate orders. The patient will be seen 

and fully evaluated, including a full history and physical exam, by a provider (

in Main ED or Fast Track) when a room becomes available.





------------------------------------------------------------------





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 

complaints of pain to both hips and thighs ongoing for the past 1-2 days.  He 

has been taking his oxycodone with minimal relief.  He has not missed any doses 

of his hydroxyurea or folic acid.  He has a pain crisis every month, 1-2 times 

per month.


TRAVEL OUTSIDE OF THE [place] IN LAST 30 DAYS: No





- Related Data


Allergies/Adverse Reactions: 


 





No Known Allergies Allergy (Verified 04/06/18 16:56)


 











Past Medical History





- Social History


Chew tobacco use (# tins/day): No


Frequency of alcohol use: Social


Drug Abuse: None


Renal/ Medical History: Denies: Hx Peritoneal Dialysis


Past Surgical History: Reports: Hx Cholecystectomy, Other - Port placement





- Immunizations


History of Influenza Vaccine for 10/2017 - 3/2018 Season: Yes


Influenza Administration Date for 10/2017 - 3/2018 Season: 10/01/17





Physical Exam





- Vital signs


Vitals: 





 











Temp Pulse Resp BP Pulse Ox


 


 98.2 F   88   16   145/91 H  99 


 


 04/06/18 16:58  04/06/18 16:58  04/06/18 16:58  04/06/18 16:58  04/06/18 16:58














Course





- Vital Signs


Vital signs: 





 











Temp Pulse Resp BP Pulse Ox


 


 98.2 F   88   16   145/91 H  99 


 


 04/06/18 16:58  04/06/18 16:58  04/06/18 16:58  04/06/18 16:58  04/06/18 16:58